# Patient Record
Sex: FEMALE | URBAN - METROPOLITAN AREA
[De-identification: names, ages, dates, MRNs, and addresses within clinical notes are randomized per-mention and may not be internally consistent; named-entity substitution may affect disease eponyms.]

---

## 2018-05-02 ENCOUNTER — IMPORTED ENCOUNTER (OUTPATIENT)
Dept: URBAN - METROPOLITAN AREA CLINIC 1 | Facility: CLINIC | Age: 23
End: 2018-05-02

## 2018-05-02 PROBLEM — H57.13: Noted: 2018-05-02

## 2018-05-02 PROBLEM — H43.393: Noted: 2018-05-02

## 2018-05-02 PROBLEM — R51: Noted: 2018-05-02

## 2018-05-02 PROCEDURE — 92004 COMPRE OPH EXAM NEW PT 1/>: CPT

## 2018-05-02 NOTE — PATIENT DISCUSSION
1. Pain In/Around the eyes - secondary to Migraine Headache. Advised patient of migraine triggers such as stress hormonal changes dietary components (caffeine MSG). Advised to follow up with PCP as scheduled or sooner if symptoms continue or worsen 2. Floaters OU- RD precautions Patient defers the refraction at today's visitReturn for an appointment in 1 year 27 with Dr. Sacha Ospina.

## 2022-04-03 ASSESSMENT — TONOMETRY
OD_IOP_MMHG: 18
OS_IOP_MMHG: 18

## 2022-04-03 ASSESSMENT — VISUAL ACUITY
OS_CC: 20/20
OD_CC: 20/20